# Patient Record
(demographics unavailable — no encounter records)

---

## 2025-02-24 NOTE — REVIEW OF SYSTEMS
[Fatigue] : fatigue [Nausea] : nausea [Vomiting] : vomiting [Negative] : Heme/Lymph [FreeTextEntry7] : vomiting subsided- epig pain [FreeTextEntry8] : urine is darker

## 2025-02-24 NOTE — PHYSICAL EXAM
[Normal Sclera/Conjunctiva] : normal sclera/conjunctiva [No Respiratory Distress] : no respiratory distress  [No Accessory Muscle Use] : no accessory muscle use [Normal] : affect was normal and insight and judgment were intact [de-identified] : does not seem very tender epig- ? distended

## 2025-02-24 NOTE — PHYSICAL EXAM
[Normal Sclera/Conjunctiva] : normal sclera/conjunctiva [No Respiratory Distress] : no respiratory distress  [No Accessory Muscle Use] : no accessory muscle use [Normal] : affect was normal and insight and judgment were intact [de-identified] : does not seem very tender epig- ? distended

## 2025-02-24 NOTE — ASSESSMENT
[FreeTextEntry1] : 1-patient has had many symptoms including nausea vomiting that lasted 2 days accompanied by epig pain, fatigue and lack of appetite. Now feels a little less tired and eating better. Epigastric pain is also better.  Patient has no history of ulcer disease. I do not think this is norovirus patient did not have severe headache, diarrhea, fever or chills, but still could be some viral syndrome. However, will do some labs to rule out any other process going on, including amylase lipase. For now, encouraged patient to eat bland diet, hydrate very well can get some vitamin water, drink lemonade, ginger ale and herbal teas. Can also add some probiotic. Will call patient with lab result.

## 2025-02-24 NOTE — HISTORY OF PRESENT ILLNESS
[FreeTextEntry8] : This visit was provided via telehealth using real-time 2-way audio visual technology.. Verbal consent given on 02/22/2025 at 17:51 by Clau Doran, ~  ~. ptn is at her home in [facility] and the provider is at the office in [facility].  67 yrs old female with PMHx of HTN, HLD, prediabetes, presents with 5 days hx of vomiting, dry heaving, extremely tired and fatigue, has upper epig pain and feels distended. urine is dark yellow/? orange. no fever, no chills. HR is 90. Vomiting subsided on second day, now still feels tired but better, and appetite is a little better. No diarrhea, no severe HA.

## 2025-02-24 NOTE — HISTORY OF PRESENT ILLNESS
[FreeTextEntry8] : This visit was provided via telehealth using real-time 2-way audio visual technology.. Verbal consent given on 02/22/2025 at 17:51 by Clau Doran, ~  ~. ptn is at her home in [facility] and the provider is at the office in [facility].  67 yrs old female with PMHx of HTN, HLD, prediabetes, presents with 5 days hx of vomiting, dry heaving, extremely tired and fatigue, has upper epig pain and feels distended. urine is dark yellow/? orange. no fever, no chills. HR is 90. Vomiting subsided on second day, now still feels tired but better, and appetite is a little better. No diarrhea, no severe HA.

## 2025-06-11 NOTE — HISTORY OF PRESENT ILLNESS
[FreeTextEntry1] : CPE [de-identified] : Ms. Clau Doran is a 67 year old female presenting for CPE  - reviewed age appropriate preventive screening exams - reviewed and updated PMH/Medications

## 2025-06-11 NOTE — HEALTH RISK ASSESSMENT
[Yes] : Yes [No falls in past year] : Patient reported no falls in the past year [0] : 2) Feeling down, depressed, or hopeless: Not at all (0) [PHQ-2 Negative - No further assessment needed] : PHQ-2 Negative - No further assessment needed [Never] : Never [Patient reported mammogram was normal] : Patient reported mammogram was normal [Patient declined colonoscopy] : Patient declined colonoscopy [Employed] : employed [Feels Safe at Home] : Feels safe at home [Fully functional (bathing, dressing, toileting, transferring, walking, feeding)] : Fully functional (bathing, dressing, toileting, transferring, walking, feeding) [Fully functional (using the telephone, shopping, preparing meals, housekeeping, doing laundry, using] : Fully functional and needs no help or supervision to perform IADLs (using the telephone, shopping, preparing meals, housekeeping, doing laundry, using transportation, managing medications and managing finances) [] :  [[id number]] : 0 [Reports changes in hearing] : Reports no changes in hearing [Reports changes in vision] : Reports no changes in vision [Reports changes in dental health] : Reports no changes in dental health [MammogramDate] : 12/24 [ColonoscopyDate] : 01/09 [ColonoscopyComments] : Now doing Cologuard

## 2025-06-11 NOTE — HISTORY OF PRESENT ILLNESS
[FreeTextEntry1] : CPE [de-identified] : Ms. Clau Doran is a 67 year old female presenting for CPE  - reviewed age appropriate preventive screening exams - reviewed and updated PMH/Medications

## 2025-06-11 NOTE — HEALTH RISK ASSESSMENT
[Yes] : Yes [No falls in past year] : Patient reported no falls in the past year [0] : 2) Feeling down, depressed, or hopeless: Not at all (0) [PHQ-2 Negative - No further assessment needed] : PHQ-2 Negative - No further assessment needed [Never] : Never [Patient reported mammogram was normal] : Patient reported mammogram was normal [Patient declined colonoscopy] : Patient declined colonoscopy [Employed] : employed [Feels Safe at Home] : Feels safe at home [Fully functional (bathing, dressing, toileting, transferring, walking, feeding)] : Fully functional (bathing, dressing, toileting, transferring, walking, feeding) [Fully functional (using the telephone, shopping, preparing meals, housekeeping, doing laundry, using] : Fully functional and needs no help or supervision to perform IADLs (using the telephone, shopping, preparing meals, housekeeping, doing laundry, using transportation, managing medications and managing finances) [] :  [[id number]] : 0 [Reports changes in hearing] : Reports no changes in hearing [Reports changes in vision] : Reports no changes in vision [Reports changes in dental health] : Reports no changes in dental health [MammogramDate] : 12/24 [ColonoscopyDate] : 01/09 [ColonoscopyComments] : Now doing Cologuard